# Patient Record
(demographics unavailable — no encounter records)

---

## 2025-06-27 NOTE — ADDENDUM
[FreeTextEntry1] :  I, Halina Radford assisted in documentation on 06/27/2025 acting as a scribe for Dr. Jairo Willett. All medical record entries made by my scribe were at my, Dr. Jairo Willett, direction and personally dictated by me. I have reviewed the chart and agree that the record accurately reflects my personal performance of the history, physical exam, assessment and plan. I have also personally directed, reviewed, and agreed with the chart.

## 2025-06-27 NOTE — ASSESSMENT
[FreeTextEntry1] : JONATHAN GRYE is a 26 y/o F JONATHAN GREY is a 26 y/o F presenting for consultation for atypical dysplastic nevus on superior T-spine, right medial superior chest, right lateral superior chest, and left lateral breast.   Exam findings discussed with patient in detail.     I explained that following excision there will be a full thickness defect of the involved area. The reconstructive options will be based on the defect size and surrounding tissue laxity of the involved area. Primary closure is only possible for smaller defects.   For larger defects, local tissue rearrangement or skin grafting may be necessary. The patient was explained the risks of each option. Risks following layered primary closure or local tissue rearrangement include wound dehiscence, contour irregularity, bleeding, infection, and paraesthesias. Risks following skin grafting include wound dehiscence,   skin graft nonadherence (partial or complete), contour irregularity, bleeding, infection, paraesthesias, and donor site complications.  All questions were answered; the patient fully understands the risks and plan, agreeable to proceed.   - Plan to schedule excision after summer 2025.  - Discussed anticipated healing and scarring with excision of lesions.   - Discussed post-op care, including massaging the scars and use of silicone sheets.

## 2025-06-27 NOTE — PHYSICAL EXAM
[TextEntry] : Back: Upper back midline with biopsy site with remaining pigmentation noted.   Chest: Right anterior chest with previous biopsy site, healing well.    Right breast: Previous biopsy site, healing well.      Left breast: Previous biopsy site, 3 o'clock to 4 o'clock, healing with some remaining pigmentation.     Constitutional: NAD, well-nourished HENT: Normocephalic, atraumatic, PERRL, non-icteric sclerae, neck supple, trachea midline PULM: LSCTAB, no wheezing or rhonchi CV: RRR, no murmur, rubs, or gallops Abd: Soft, NT, ND, +BS, no palpable masses or bulge MSK: ABREU Skin: No rash noted  Neuro: A&O x 3, no FND

## 2025-06-27 NOTE — HISTORY OF PRESENT ILLNESS
[FreeTextEntry1] : JONATHAN GREY is a 24 y/o F presenting for consultation for atypical dysplastic nevus on superior T-spine, right medial superior chest, right lateral superior chest, and left lateral breast.   Referred by dermatology.   Accompanied by mother.    Atypical skin lesions found on annual dermatology appointment.   All four lesions identified as precancerous during recent appointment with dermatology.     Patient considering delaying procedure until after summer to limit sun exposure.     Patient concerned about potential appearance of scars.     Otherwise, denies cp, sob, subjective fever, chills, headache, cough, myalgia, malaise, abd pain, n/v/d, decreased appetite, and generalized weakness.     PMHx: acute URI, acute UTI, Candida infection of groin area, childhood asthma, COVID-19, anxiety   PSHx: ear equalization tube insertion   Family hx: CVA (grandmother, grandfather), HTN (father), malignant neoplasm of breast (mother)   Allergies: Biaxin, fruit, nuts   Current meds: none   Social hx: works as fertility nurse, presence of IUD